# Patient Record
Sex: MALE | URBAN - METROPOLITAN AREA
[De-identification: names, ages, dates, MRNs, and addresses within clinical notes are randomized per-mention and may not be internally consistent; named-entity substitution may affect disease eponyms.]

---

## 2022-07-28 ENCOUNTER — OUT OF OFFICE VISIT (OUTPATIENT)
Dept: URBAN - METROPOLITAN AREA MEDICAL CENTER 16 | Facility: MEDICAL CENTER | Age: 77
End: 2022-07-28
Payer: MEDICARE

## 2022-07-28 DIAGNOSIS — Z79.82 ASPIRIN LONG-TERM USE: ICD-10-CM

## 2022-07-28 DIAGNOSIS — R19.5 ABNORMAL CONSISTENCY OF STOOL: ICD-10-CM

## 2022-07-28 DIAGNOSIS — D64.89 ANEMIA DUE TO OTHER CAUSE: ICD-10-CM

## 2022-07-28 DIAGNOSIS — Z79.01 ANTICOAGULANT LONG-TERM USE: ICD-10-CM

## 2022-07-28 PROCEDURE — G8427 DOCREV CUR MEDS BY ELIG CLIN: HCPCS

## 2022-07-28 PROCEDURE — 99222 1ST HOSP IP/OBS MODERATE 55: CPT

## 2022-07-28 PROCEDURE — 99232 SBSQ HOSP IP/OBS MODERATE 35: CPT

## 2022-08-04 ENCOUNTER — OUT OF OFFICE VISIT (OUTPATIENT)
Dept: URBAN - METROPOLITAN AREA MEDICAL CENTER 16 | Facility: MEDICAL CENTER | Age: 77
End: 2022-08-04
Payer: MEDICARE

## 2022-08-04 DIAGNOSIS — D64.89 ANEMIA DUE TO OTHER CAUSE: ICD-10-CM

## 2022-08-04 DIAGNOSIS — R19.5 ABNORMAL CONSISTENCY OF STOOL: ICD-10-CM

## 2022-08-04 DIAGNOSIS — R13.19 CERVICAL DYSPHAGIA: ICD-10-CM

## 2022-08-04 DIAGNOSIS — Z79.01 ANTICOAGULANT LONG-TERM USE: ICD-10-CM

## 2022-08-04 PROCEDURE — 99233 SBSQ HOSP IP/OBS HIGH 50: CPT | Performed by: INTERNAL MEDICINE

## 2022-08-05 ENCOUNTER — OUT OF OFFICE VISIT (OUTPATIENT)
Dept: URBAN - METROPOLITAN AREA MEDICAL CENTER 16 | Facility: MEDICAL CENTER | Age: 77
End: 2022-08-05
Payer: MEDICARE

## 2022-08-05 DIAGNOSIS — K31.89 ACQUIRED DEFORMITY OF DUODENUM: ICD-10-CM

## 2022-08-05 DIAGNOSIS — R13.19 CERVICAL DYSPHAGIA: ICD-10-CM

## 2022-08-05 DIAGNOSIS — K29.30 CHRONIC SUPERFICIAL GASTRITIS: ICD-10-CM

## 2022-08-05 PROCEDURE — 43239 EGD BIOPSY SINGLE/MULTIPLE: CPT | Performed by: INTERNAL MEDICINE

## 2022-08-16 ENCOUNTER — TELEPHONE ENCOUNTER (OUTPATIENT)
Dept: URBAN - METROPOLITAN AREA CLINIC 118 | Facility: CLINIC | Age: 77
End: 2022-08-16

## 2022-08-23 ENCOUNTER — TELEPHONE ENCOUNTER (OUTPATIENT)
Dept: URBAN - METROPOLITAN AREA CLINIC 118 | Facility: CLINIC | Age: 77
End: 2022-08-23

## 2022-08-25 ENCOUNTER — OFFICE VISIT (OUTPATIENT)
Dept: URBAN - METROPOLITAN AREA CLINIC 118 | Facility: CLINIC | Age: 77
End: 2022-08-25

## 2022-08-25 ENCOUNTER — OFFICE VISIT (OUTPATIENT)
Dept: URBAN - METROPOLITAN AREA CLINIC 118 | Facility: CLINIC | Age: 77
End: 2022-08-25
Payer: MEDICARE

## 2022-08-25 VITALS — WEIGHT: 176 LBS | HEIGHT: 69 IN | BODY MASS INDEX: 26.07 KG/M2

## 2022-08-25 DIAGNOSIS — R13.12 OROPHARYNGEAL DYSPHAGIA: ICD-10-CM

## 2022-08-25 DIAGNOSIS — D64.9 SYMPTOMATIC ANEMIA: ICD-10-CM

## 2022-08-25 PROCEDURE — 99213 OFFICE O/P EST LOW 20 MIN: CPT | Performed by: INTERNAL MEDICINE

## 2022-08-25 RX ORDER — CARVEDILOL 6.25 MG/1
1 TABLET WITH FOOD TABLET, FILM COATED ORAL TWICE A DAY
Status: ACTIVE | COMMUNITY

## 2022-08-25 RX ORDER — FERROUS SULFATE 325(65) MG
1 TABLET TABLET ORAL ONCE A DAY
Status: ACTIVE | COMMUNITY

## 2022-08-25 RX ORDER — PANTOPRAZOLE SODIUM 40 MG/1
1 TABLET TABLET, DELAYED RELEASE ORAL TWICE A DAY
Status: ACTIVE | COMMUNITY

## 2022-08-25 RX ORDER — MIDODRINE HYDROCHLORIDE 2.5 MG/1
1 TABLET TABLET ORAL TWICE A DAY
Status: ACTIVE | COMMUNITY

## 2022-08-25 RX ORDER — TRAZODONE HYDROCHLORIDE 50 MG/1
1 TABLET AT BEDTIME AS NEEDED TABLET ORAL ONCE A DAY
Status: ACTIVE | COMMUNITY

## 2022-08-25 RX ORDER — ZINC OXIDE 400 MG/G
AS DIRECTED PASTE TOPICAL
Status: ACTIVE | COMMUNITY

## 2022-08-25 RX ORDER — LEVOTHYROXINE SODIUM 25 UG/1
1 TABLET IN THE MORNING ON AN EMPTY STOMACH TABLET ORAL ONCE A DAY
Status: ACTIVE | COMMUNITY

## 2022-08-25 RX ORDER — GABAPENTIN 100 MG/1
1 CAPSULE CAPSULE ORAL ONCE A DAY
Status: ACTIVE | COMMUNITY

## 2022-08-25 RX ORDER — TAMSULOSIN HYDROCHLORIDE 0.4 MG/1
1 CAPSULE CAPSULE ORAL ONCE A DAY
Status: ACTIVE | COMMUNITY

## 2022-08-25 RX ORDER — SEVELAMER HYDROCHLORIDE 800 MG/1
1 TABLET WITH MEALS TABLET, FILM COATED ORAL THREE TIMES A DAY
Status: ACTIVE | COMMUNITY

## 2022-08-25 NOTE — PHYSICAL EXAM CONSTITUTIONAL:
NAD but disheveled (however he had just been vomiting so that would likely explain the patient's appearance)

## 2022-08-25 NOTE — HPI-TODAY'S VISIT:
here for f/u after hospitalization, had been improving, intermittant dysphagia , just started vomiting today  Hospital notes: Mr. Mayo is a 78 y/o AAM with PMHx of A-fib on Eliquis and daily ASA, vascular dementia, CVA x3 (last episode in 2020), HLD, HTN, PAD, ESRD on dialysis. He presented to the ED yesterday due to being lethargic/ weakness. This is ongoing since his last stroke. Hgb is 8.2 on admission and FOBT (+). GI is consulted for evaluation of anemia. His wife is present at beside and provides the history 2/2 to pts dementia. She reports 1 epiosde BRBPR 3 weeks ago but none since. Denies melena, abdominal pain, NV, . Hx of anemia with recent hgb of 9.8. Of note, he just recently started PD. On Eliquis, last dose was yesterday AM. Last colonoscopy was several years ago, unsure if he has had EGD prior.    GI is called back for evaluation of anemia and dysphagia. Patient previously seen for anemia with FOBT (+) on daily Eliquis and ASA and had planned for EGD/Colon. Pt was unable to complete prep and had aspiration event that led to him being intubated in the ICU. He is no longer in the ICU and is doing well today. He is tolerating pureed liquids without difficulty. Hgb has been stable for the past several days, just restarted on Eliquis this AM.     EGD 8/5/22 	No gross lesions second portion of duodenum 	Diffuse nodularity of the duodenal bulb and sweep.  Cold forceps used to obtain biopsies 	Mild to moderate gastritis patchy distribution throughout the stomach.  Mild superficial erosions no active bleeding however.  Biopsies were taken to rule out H. Pylori infection.  A total of 5 biopsies were taken, 2 from the antrum, 1 from the incisura, 2 from the body. 	GE junction located 43 cm from incisors 	No gross lesions in the esophagus to explain dysphagia, specifically no stricture no ring no web.  Patient does have thick secretions that are hanging in the esophagus which required suctioning   RECOMMENDATIONS:  	Suspect combination of presbyesophagus and thick secretions contribute to the patient's difficulty with swallowing.  No structural abnormality that is amenable to endoscopic treatment 	Therefore, anticipate as long as pureed consistency or thinner patient should be able to tolerate eating from a mechanical standpoint 	Hemoglobin stable 	Patient should follow-up with us as an outpatient to review pathology results to see if any further testing/management is required 	Continue PPI 	GI will sign off, please call us back if we can be of any further assistance

## 2022-09-07 ENCOUNTER — DASHBOARD ENCOUNTERS (OUTPATIENT)
Age: 77
End: 2022-09-07

## 2022-09-09 ENCOUNTER — OFFICE VISIT (OUTPATIENT)
Dept: URBAN - METROPOLITAN AREA TELEHEALTH 2 | Facility: TELEHEALTH | Age: 77
End: 2022-09-09
Payer: MEDICARE

## 2022-09-09 VITALS — BODY MASS INDEX: 25.92 KG/M2 | WEIGHT: 175 LBS | HEIGHT: 69 IN

## 2022-09-09 DIAGNOSIS — R13.12 OROPHARYNGEAL DYSPHAGIA: ICD-10-CM

## 2022-09-09 DIAGNOSIS — D64.89 ANEMIA DUE TO OTHER CAUSE: ICD-10-CM

## 2022-09-09 PROBLEM — 71457002: Status: ACTIVE | Noted: 2022-08-25

## 2022-09-09 PROCEDURE — 99213 OFFICE O/P EST LOW 20 MIN: CPT | Performed by: INTERNAL MEDICINE

## 2022-09-09 RX ORDER — LEVOTHYROXINE SODIUM 25 UG/1
1 TABLET IN THE MORNING ON AN EMPTY STOMACH TABLET ORAL ONCE A DAY
Status: ACTIVE | COMMUNITY

## 2022-09-09 RX ORDER — FERROUS SULFATE 325(65) MG
1 TABLET TABLET ORAL ONCE A DAY
Status: ACTIVE | COMMUNITY

## 2022-09-09 RX ORDER — TAMSULOSIN HYDROCHLORIDE 0.4 MG/1
1 CAPSULE CAPSULE ORAL ONCE A DAY
Status: ACTIVE | COMMUNITY

## 2022-09-09 RX ORDER — SEVELAMER HYDROCHLORIDE 800 MG/1
1 TABLET WITH MEALS TABLET, FILM COATED ORAL THREE TIMES A DAY
Status: ACTIVE | COMMUNITY

## 2022-09-09 RX ORDER — PANTOPRAZOLE SODIUM 40 MG/1
1 TABLET TABLET, DELAYED RELEASE ORAL TWICE A DAY
Status: ACTIVE | COMMUNITY

## 2022-09-09 RX ORDER — GABAPENTIN 100 MG/1
1 CAPSULE CAPSULE ORAL ONCE A DAY
Status: ACTIVE | COMMUNITY

## 2022-09-09 RX ORDER — TRAZODONE HYDROCHLORIDE 50 MG/1
1 TABLET AT BEDTIME AS NEEDED TABLET ORAL ONCE A DAY
Status: ACTIVE | COMMUNITY

## 2022-09-09 RX ORDER — ZINC OXIDE 400 MG/G
AS DIRECTED PASTE TOPICAL
Status: ACTIVE | COMMUNITY

## 2022-09-09 RX ORDER — MIDODRINE HYDROCHLORIDE 2.5 MG/1
1 TABLET TABLET ORAL TWICE A DAY
Status: ACTIVE | COMMUNITY

## 2022-09-09 RX ORDER — CARVEDILOL 6.25 MG/1
1 TABLET WITH FOOD TABLET, FILM COATED ORAL TWICE A DAY
Status: ACTIVE | COMMUNITY

## 2022-09-09 NOTE — HPI-TODAY'S VISIT:
here for f/u after hospitalization, the hgb is improving continually with moist diet not aspirating, but when vomits (he does intermittantly) did aspirate again recently   Hospital notes: Mr. Mayo is a 78 y/o AAM with PMHx of A-fib on Eliquis and daily ASA, vascular dementia, CVA x3 (last episode in 2020), HLD, HTN, PAD, ESRD on dialysis. He presented to the ED yesterday due to being lethargic/ weakness. This is ongoing since his last stroke. Hgb is 8.2 on admission and FOBT (+). GI is consulted for evaluation of anemia. His wife is present at beside and provides the history 2/2 to pts dementia. She reports 1 epiosde BRBPR 3 weeks ago but none since. Denies melena, abdominal pain, NV, . Hx of anemia with recent hgb of 9.8. Of note, he just recently started PD. On Eliquis, last dose was yesterday AM. Last colonoscopy was several years ago, unsure if he has had EGD prior.    GI is called back for evaluation of anemia and dysphagia. Patient previously seen for anemia with FOBT (+) on daily Eliquis and ASA and had planned for EGD/Colon. Pt was unable to complete prep and had aspiration event that led to him being intubated in the ICU. He is no longer in the ICU and is doing well today. He is tolerating pureed liquids without difficulty. Hgb has been stable for the past several days, just restarted on Eliquis this AM.     EGD 8/5/22 	No gross lesions second portion of duodenum 	Diffuse nodularity of the duodenal bulb and sweep.  Cold forceps used to obtain biopsies 	Mild to moderate gastritis patchy distribution throughout the stomach.  Mild superficial erosions no active bleeding however.  Biopsies were taken to rule out H. Pylori infection.  A total of 5 biopsies were taken, 2 from the antrum, 1 from the incisura, 2 from the body. 	GE junction located 43 cm from incisors 	No gross lesions in the esophagus to explain dysphagia, specifically no stricture no ring no web.  Patient does have thick secretions that are hanging in the esophagus which required suctioning   RECOMMENDATIONS:  	Suspect combination of presbyesophagus and thick secretions contribute to the patient's difficulty with swallowing.  No structural abnormality that is amenable to endoscopic treatment 	Therefore, anticipate as long as pureed consistency or thinner patient should be able to tolerate eating from a mechanical standpoint 	Hemoglobin stable 	Patient should follow-up with us as an outpatient to review pathology results to see if any further testing/management is required 	Continue PPI 	GI will sign off, please call us back if we can be of any further assistance

## 2022-11-10 ENCOUNTER — OUT OF OFFICE VISIT (OUTPATIENT)
Dept: URBAN - METROPOLITAN AREA MEDICAL CENTER 16 | Facility: MEDICAL CENTER | Age: 77
End: 2022-11-10
Payer: MEDICARE

## 2022-11-10 DIAGNOSIS — K22.4 ESOPHAGEAL MOTILITY DISORDER: ICD-10-CM

## 2022-11-10 DIAGNOSIS — R13.12 DYSPHAGIA: ICD-10-CM

## 2022-11-10 DIAGNOSIS — K22.2 ACQUIRED ESOPHAGEAL RING: ICD-10-CM

## 2022-11-10 PROCEDURE — 99222 1ST HOSP IP/OBS MODERATE 55: CPT | Performed by: INTERNAL MEDICINE

## 2022-11-10 PROCEDURE — G8427 DOCREV CUR MEDS BY ELIG CLIN: HCPCS | Performed by: INTERNAL MEDICINE

## 2022-11-11 ENCOUNTER — OUT OF OFFICE VISIT (OUTPATIENT)
Dept: URBAN - METROPOLITAN AREA MEDICAL CENTER 16 | Facility: MEDICAL CENTER | Age: 77
End: 2022-11-11
Payer: MEDICARE

## 2022-11-11 DIAGNOSIS — K22.4 ESOPHAGEAL MOTILITY DISORDER: ICD-10-CM

## 2022-11-11 DIAGNOSIS — R13.12 DYSPHAGIA: ICD-10-CM

## 2022-11-11 DIAGNOSIS — K22.2 ACQUIRED ESOPHAGEAL RING: ICD-10-CM

## 2022-11-11 PROCEDURE — 99231 SBSQ HOSP IP/OBS SF/LOW 25: CPT

## 2022-11-13 ENCOUNTER — OUT OF OFFICE VISIT (OUTPATIENT)
Dept: URBAN - METROPOLITAN AREA MEDICAL CENTER 16 | Facility: MEDICAL CENTER | Age: 77
End: 2022-11-13
Payer: MEDICARE

## 2022-11-13 DIAGNOSIS — K22.4 ESOPHAGEAL MOTILITY DISORDER: ICD-10-CM

## 2022-11-13 DIAGNOSIS — K22.2 ACQUIRED ESOPHAGEAL RING: ICD-10-CM

## 2022-11-13 DIAGNOSIS — R13.12 DYSPHAGIA: ICD-10-CM

## 2022-11-13 PROCEDURE — 99231 SBSQ HOSP IP/OBS SF/LOW 25: CPT | Performed by: INTERNAL MEDICINE

## 2023-01-05 PROBLEM — 63305008 STRICTURE OF ESOPHAGUS: Status: ACTIVE | Noted: 2023-01-05

## 2023-01-05 PROBLEM — 79962008 ESOPHAGEAL MOTILITY DISORDER: Status: ACTIVE | Noted: 2023-01-05

## 2023-01-13 ENCOUNTER — TELEPHONE ENCOUNTER (OUTPATIENT)
Dept: URBAN - METROPOLITAN AREA CLINIC 118 | Facility: CLINIC | Age: 78
End: 2023-01-13

## 2023-04-13 ENCOUNTER — CLAIMS CREATED FROM THE CLAIM WINDOW (OUTPATIENT)
Dept: URBAN - METROPOLITAN AREA MEDICAL CENTER 16 | Facility: MEDICAL CENTER | Age: 78
End: 2023-04-13

## 2023-04-13 ENCOUNTER — CLAIMS CREATED FROM THE CLAIM WINDOW (OUTPATIENT)
Dept: URBAN - METROPOLITAN AREA MEDICAL CENTER 16 | Facility: MEDICAL CENTER | Age: 78
End: 2023-04-13
Payer: MEDICARE

## 2023-04-13 DIAGNOSIS — K92.1 HEMATOCHEZIA: ICD-10-CM

## 2023-04-13 PROCEDURE — 99222 1ST HOSP IP/OBS MODERATE 55: CPT | Performed by: INTERNAL MEDICINE

## 2023-04-13 PROCEDURE — G8427 DOCREV CUR MEDS BY ELIG CLIN: HCPCS | Performed by: INTERNAL MEDICINE

## 2023-05-04 ENCOUNTER — OUT OF OFFICE VISIT (OUTPATIENT)
Dept: URBAN - METROPOLITAN AREA MEDICAL CENTER 16 | Facility: MEDICAL CENTER | Age: 78
End: 2023-05-04

## 2023-05-04 ENCOUNTER — CLAIMS CREATED FROM THE CLAIM WINDOW (OUTPATIENT)
Dept: URBAN - METROPOLITAN AREA MEDICAL CENTER 16 | Facility: MEDICAL CENTER | Age: 78
End: 2023-05-04
Payer: MEDICARE

## 2023-05-04 DIAGNOSIS — K92.1 HEMATOCHEZIA: ICD-10-CM

## 2023-05-04 DIAGNOSIS — N18.6 END STAGE RENAL DISEASE: ICD-10-CM

## 2023-05-04 DIAGNOSIS — Z99.2 DEPENDENCE ON PERITONEAL DIALYSIS: ICD-10-CM

## 2023-05-04 DIAGNOSIS — D64.89 ANEMIA DUE TO OTHER CAUSE: ICD-10-CM

## 2023-05-04 PROCEDURE — 99214 OFFICE O/P EST MOD 30 MIN: CPT

## 2023-05-06 ENCOUNTER — OUT OF OFFICE VISIT (OUTPATIENT)
Dept: URBAN - METROPOLITAN AREA MEDICAL CENTER 16 | Facility: MEDICAL CENTER | Age: 78
End: 2023-05-06

## 2023-05-06 ENCOUNTER — CLAIMS CREATED FROM THE CLAIM WINDOW (OUTPATIENT)
Dept: URBAN - METROPOLITAN AREA MEDICAL CENTER 16 | Facility: MEDICAL CENTER | Age: 78
End: 2023-05-06
Payer: MEDICARE

## 2023-05-06 DIAGNOSIS — D64.89 ANEMIA DUE TO OTHER CAUSE: ICD-10-CM

## 2023-05-06 DIAGNOSIS — K92.1 HEMATOCHEZIA: ICD-10-CM

## 2023-05-06 DIAGNOSIS — K52.89 STERCORAL COLITIS: ICD-10-CM

## 2023-05-06 DIAGNOSIS — K63.89 OTHER SPECIFIED DISORDER OF INTESTINES: ICD-10-CM

## 2023-05-06 DIAGNOSIS — K56.41 FECAL IMPACTION: ICD-10-CM

## 2023-05-06 DIAGNOSIS — K92.2 ACUTE GASTROINTESTINAL BLEEDING: ICD-10-CM

## 2023-05-06 PROCEDURE — 43235 EGD DIAGNOSTIC BRUSH WASH: CPT | Performed by: INTERNAL MEDICINE

## 2023-05-06 PROCEDURE — 45378 DIAGNOSTIC COLONOSCOPY: CPT | Performed by: INTERNAL MEDICINE

## 2023-05-07 ENCOUNTER — CLAIMS CREATED FROM THE CLAIM WINDOW (OUTPATIENT)
Dept: URBAN - METROPOLITAN AREA MEDICAL CENTER 16 | Facility: MEDICAL CENTER | Age: 78
End: 2023-05-07

## 2023-05-07 ENCOUNTER — CLAIMS CREATED FROM THE CLAIM WINDOW (OUTPATIENT)
Dept: URBAN - METROPOLITAN AREA MEDICAL CENTER 16 | Facility: MEDICAL CENTER | Age: 78
End: 2023-05-07
Payer: MEDICARE

## 2023-05-07 DIAGNOSIS — D12.3 ADENOMA OF TRANSVERSE COLON: ICD-10-CM

## 2023-05-07 DIAGNOSIS — D12.2 ADENOMA OF ASCENDING COLON: ICD-10-CM

## 2023-05-07 DIAGNOSIS — D12.5 ADENOMA OF SIGMOID COLON: ICD-10-CM

## 2023-05-07 DIAGNOSIS — Z93.1 FEEDING BY G-TUBE: ICD-10-CM

## 2023-05-07 DIAGNOSIS — K63.89 OTHER SPECIFIED DISEASES OF INTESTINE: ICD-10-CM

## 2023-05-07 DIAGNOSIS — D64.89 ANEMIA DUE TO OTHER CAUSE: ICD-10-CM

## 2023-05-07 PROCEDURE — 45385 COLONOSCOPY W/LESION REMOVAL: CPT | Performed by: INTERNAL MEDICINE

## 2023-05-07 PROCEDURE — 99232 SBSQ HOSP IP/OBS MODERATE 35: CPT | Performed by: INTERNAL MEDICINE

## 2023-05-07 PROCEDURE — 99213 OFFICE O/P EST LOW 20 MIN: CPT | Performed by: INTERNAL MEDICINE

## 2023-05-07 PROCEDURE — 45381 COLONOSCOPY SUBMUCOUS NJX: CPT | Performed by: INTERNAL MEDICINE

## 2023-05-07 PROCEDURE — 99214 OFFICE O/P EST MOD 30 MIN: CPT | Performed by: INTERNAL MEDICINE

## 2023-05-07 PROCEDURE — 99231 SBSQ HOSP IP/OBS SF/LOW 25: CPT | Performed by: INTERNAL MEDICINE

## 2023-05-09 ENCOUNTER — CLAIMS CREATED FROM THE CLAIM WINDOW (OUTPATIENT)
Dept: URBAN - METROPOLITAN AREA MEDICAL CENTER 16 | Facility: MEDICAL CENTER | Age: 78
End: 2023-05-09
Payer: MEDICARE

## 2023-05-09 ENCOUNTER — OUT OF OFFICE VISIT (OUTPATIENT)
Dept: URBAN - METROPOLITAN AREA MEDICAL CENTER 16 | Facility: MEDICAL CENTER | Age: 78
End: 2023-05-09

## 2023-05-09 DIAGNOSIS — K63.89 OTHER SPECIFIED DISORDER OF INTESTINES: ICD-10-CM

## 2023-05-09 DIAGNOSIS — D12.3 ADENOMA OF TRANSVERSE COLON: ICD-10-CM

## 2023-05-09 DIAGNOSIS — K56.41 FECAL IMPACTION: ICD-10-CM

## 2023-05-09 DIAGNOSIS — D12.5 ADENOMA OF SIGMOID COLON: ICD-10-CM

## 2023-05-09 DIAGNOSIS — D12.2 ADENOMA OF ASCENDING COLON: ICD-10-CM

## 2023-05-09 DIAGNOSIS — K52.89 STERCORAL COLITIS: ICD-10-CM

## 2023-05-09 DIAGNOSIS — D64.89 ANEMIA DUE TO OTHER CAUSE: ICD-10-CM

## 2023-05-09 PROCEDURE — 99214 OFFICE O/P EST MOD 30 MIN: CPT

## 2023-05-09 PROCEDURE — 99213 OFFICE O/P EST LOW 20 MIN: CPT

## 2023-05-22 ENCOUNTER — OUT OF OFFICE VISIT (OUTPATIENT)
Dept: URBAN - METROPOLITAN AREA MEDICAL CENTER 16 | Facility: MEDICAL CENTER | Age: 78
End: 2023-05-22
Payer: MEDICARE

## 2023-05-22 DIAGNOSIS — J96.02 ACUTE RESPIRATORY FAILURE WITH HYPERCAPNIA: ICD-10-CM

## 2023-05-22 DIAGNOSIS — J96.01 ACUTE RESPIRATORY FAILURE WITH HYPOXEMIA: ICD-10-CM

## 2023-05-22 DIAGNOSIS — K91.840 POSTOPERATIVE HEMORRHAGE INVOLVING DIGESTIVE SYSTEM FOLLOWING DIGESTIVE SYSTEM PROCEDURE: ICD-10-CM

## 2023-05-22 DIAGNOSIS — Z79.01 ANTICOAGULANT LONG-TERM USE: ICD-10-CM

## 2023-05-22 DIAGNOSIS — K92.1 HEMATOCHEZIA: ICD-10-CM

## 2023-05-22 DIAGNOSIS — R57.8 HEMORRHAGIC SHOCK: ICD-10-CM

## 2023-05-22 PROCEDURE — 99223 1ST HOSP IP/OBS HIGH 75: CPT

## 2023-05-22 PROCEDURE — G8427 DOCREV CUR MEDS BY ELIG CLIN: HCPCS

## 2023-05-22 PROCEDURE — 99232 SBSQ HOSP IP/OBS MODERATE 35: CPT

## 2023-06-01 ENCOUNTER — TELEPHONE ENCOUNTER (OUTPATIENT)
Dept: URBAN - METROPOLITAN AREA CLINIC 118 | Facility: CLINIC | Age: 78
End: 2023-06-01

## 2023-06-02 ENCOUNTER — OFFICE VISIT (OUTPATIENT)
Dept: URBAN - METROPOLITAN AREA TELEHEALTH 2 | Facility: TELEHEALTH | Age: 78
End: 2023-06-02

## 2023-06-02 RX ORDER — TAMSULOSIN HYDROCHLORIDE 0.4 MG/1
1 CAPSULE CAPSULE ORAL ONCE A DAY
Status: ACTIVE | COMMUNITY

## 2023-06-02 RX ORDER — GABAPENTIN 100 MG/1
1 CAPSULE CAPSULE ORAL ONCE A DAY
Status: ACTIVE | COMMUNITY

## 2023-06-02 RX ORDER — SEVELAMER HYDROCHLORIDE 800 MG/1
1 TABLET WITH MEALS TABLET, FILM COATED ORAL THREE TIMES A DAY
Status: ACTIVE | COMMUNITY

## 2023-06-02 RX ORDER — PANTOPRAZOLE SODIUM 40 MG/1
1 TABLET TABLET, DELAYED RELEASE ORAL TWICE A DAY
Status: ACTIVE | COMMUNITY

## 2023-06-02 RX ORDER — LEVOTHYROXINE SODIUM 25 UG/1
1 TABLET IN THE MORNING ON AN EMPTY STOMACH TABLET ORAL ONCE A DAY
Status: ACTIVE | COMMUNITY

## 2023-06-02 RX ORDER — FERROUS SULFATE 325(65) MG
1 TABLET TABLET ORAL ONCE A DAY
Status: ACTIVE | COMMUNITY

## 2023-06-02 RX ORDER — CARVEDILOL 6.25 MG/1
1 TABLET WITH FOOD TABLET, FILM COATED ORAL TWICE A DAY
Status: ACTIVE | COMMUNITY

## 2023-06-02 RX ORDER — MIDODRINE HYDROCHLORIDE 2.5 MG/1
1 TABLET TABLET ORAL TWICE A DAY
Status: ACTIVE | COMMUNITY

## 2023-06-02 RX ORDER — TRAZODONE HYDROCHLORIDE 50 MG/1
1 TABLET AT BEDTIME AS NEEDED TABLET ORAL ONCE A DAY
Status: ACTIVE | COMMUNITY

## 2023-06-02 RX ORDER — ZINC OXIDE 400 MG/G
AS DIRECTED PASTE TOPICAL
Status: ACTIVE | COMMUNITY